# Patient Record
Sex: FEMALE | Race: WHITE
[De-identification: names, ages, dates, MRNs, and addresses within clinical notes are randomized per-mention and may not be internally consistent; named-entity substitution may affect disease eponyms.]

---

## 2017-05-11 ENCOUNTER — HOSPITAL ENCOUNTER (OUTPATIENT)
Dept: HOSPITAL 45 - C.ONC | Age: 52
Discharge: HOME | End: 2017-05-11
Attending: PHYSICIAN ASSISTANT
Payer: COMMERCIAL

## 2017-05-11 VITALS
SYSTOLIC BLOOD PRESSURE: 110 MMHG | HEART RATE: 68 BPM | DIASTOLIC BLOOD PRESSURE: 78 MMHG | OXYGEN SATURATION: 96 % | TEMPERATURE: 98.78 F

## 2017-05-11 DIAGNOSIS — Z92.3: ICD-10-CM

## 2017-05-11 DIAGNOSIS — Z85.3: ICD-10-CM

## 2017-05-11 DIAGNOSIS — Z08: Primary | ICD-10-CM

## 2017-05-11 NOTE — RADIATION ONCOLOGY FOLLOW-UP
Radiation Oncology Follow-Up


Date of Visit


May 11, 2017.


 (Audra Kirk PA-C)





Reason For Visit


One-month follow-up and cancer survivorship care plan


 (Audra Kirk PA-C)





Radiation Completion Date


4/10/17


 (Audra Kirk PA-C)





Diagnosis





(1) DCIS (ductal carcinoma in situ) of breast


Onset Date:  11/18/2016


Stage:  0


Permanent Comment:  Status post abnormal right breast mammogram 10/28/2016


Status post stereotactic guided biopsies 11/18/2016 2 lesions were biopsied and 

positive for DCIS


Estrogen receptor positive and progesterone receptor positive


Status post lumpectomies and sentinel lymph node biopsy 11/23/2016


Multifocal DCIS


Stage pTis [id number]


Status post completion of radiation therapy 04/10/2017 received 6640 cGy  Last 

Edited By: Audra Kirk on Apr 20, 2017 10:33 (Audra Kirk PA-C)





History of Present Illness


Ms. Mazariegos is a 52-year-old postmenopausal female who initially presented with 

an abnormal mammogram on 10/28/2016 which revealed a cluster of calcifications 

in the right breast.  The patient was brought back on 11/09/2016 for a 

unilateral right diagnostic mammogram and ultrasound which revealed a new 

irregular 8.7 mm cluster of pleomorphic an amorphous cluster of 

microcalcifications in the 9:00 middle one third of the right breast and a 

smaller appearing cluster of microcalcifications just inferior and medial to 

the first measuring 3.6 mm.  At the time of the procedure and studies, the 

radiologist didn't note the patient did have a raised lesion along the scar at 

the 10:00 right breast and was referred to a dermatologist appropriately.  The 

patient underwent stereotactic guided biopsies of the 2 lesions in the right 

breast on 11/18/2016.  The  larger right lesion was positive for ductal 

carcinoma in situ that was high-grade with comedonecrosis and was estrogen 

receptor positive and progesterone receptor positive.  The smaller right breast 

lesion was also positive for ductal carcinoma in situ that was high-grade with 

comedonecrosis and was also estrogen receptor positive and progesterone 

receptor positive.  The patient was referred





The patient was referred to Dr. Urmila Gold who did discuss treatment options 

including a mastectomy and lumpectomy followed by adjuvant radiation therapy.  

The patient was also seen by reconstructive surgeon for consideration of 

bilateral breast reduction surgery however she ultimately declined this 

procedure.  The patient elected for a lumpectomy and sentinel lymph node biopsy 

for both breast lesions in the right breast.  The patient underwent 2 

lumpectomies and sentinel lymph node biopsy of the right breast on 12/23/2016 

by Dr. Urmila Gold.  Pathology revealed multifocal ductal carcinoma in situ 

that was high-grade with comedonecrosis.  Margins were negative (full pathology 

report below).  The sentinel lymph nodes were also negative. 





The patient has been seen in consultation by Dr. Anatoliy Conroy from medical 

oncology who has recommended consideration of anti-hormonal therapy.  We are 

now seeing the patient in consultation discussed role of adjuvant radiation 

therapy.





Patient was treated with conventional radiation therapy.  Treatment was 

completed 04/10/2017.  She received 6640 cGy.





Overall, the patient is to well and has healed up well from surgery.  Of note, 

the patient did have a diagnosis of low-grade and early stage endometrial 

cancer that was treated with a LUCITA/BSO and no adjuvant therapy.


 (Audra Kirk PA-C)





Interim History


She's been doing well over this past month.  The irritation of the skin has 

steadily improved.  This mainly healed the 2 weeks following the completion of 

treatment.  She has no occasional discomfort of the breast.  She describes it 

as a "twinge".  She has noted no masses or tenderness and no change of the 

axilla.  She's had no swelling of her arm.  She has seen Dr. Gold and is 

scheduled for mammography June 1.  She has started tamoxifen and is tolerating 

this well.  Following the completion of treatment she had symptoms of upper 

respiratory infection.  These have steadily improved.  The swelling of the 

breast that occurred during treatment has continued to improve as well as the 

darkness of the skin.


 (Audra Kirk PA-C)





Allergies


Coded Allergies:  


     No Known Drug Allergy (Verified  Allergy, Unknown, None , 1/24/17)





Home Medications


Scheduled


Aloe Vera (Aloe Vera), 1 CAP PO DAILY


Atenolol (Tenormin), 12.5 MG PO DAILY


Biotin (Biotin), 4-8 TABS PO DAILY


Cinnamon (Cinnamon), 350 MG PO DAILY


Cranberry (Vaccinium Macrocarp (Cranberry), 1 CAP PO DAILY


Glucosamine-Chondroitin-Vit C- (Glucosamine Chondroitin), 1 TAB PO DAILY


Lutein-Zeaxanthin (Lutein W/Zeaxanthin), 1 TAB PO DAILY


Magnesium Oxide (Mag-Ox), 250 MG PO DAILY


Multiple Vitamins W/ Minerals (Antioxidant), 1 CAP PO Q2D


Multivitamin (Multivitamin), 1 TAB PO DAILY


Probiotic Product (Probiotic), 1 TAB PO Evening


Resveratrol (Resveratrol), 1 CAP PO DAILY


Spironolactone (Aldactone), 50 MG PO BID


Tamoxifen (Nolvadex), 20 MG PO DAILY


[Circulation support], 2 TABS PO DAILY


[Immune factors], 1 CAP PO DAILY


[Levoxyl ], 100 MCG PO DAILY





Scheduled PRN


[Digest assure], 1 TAB PO DAILY PRN for Dyspepsia


[Fluticasone Prop], 50 MCG NA BID PRN for allergies





Review of Systems


Gastrointestinal:  


   Symptoms:  WNL


Oral:  


   Symptoms:  No Problems


Respiratory:  


   Symptoms:  WNL


Urinary:  


   Symptoms:  WNL


Skin:  


   Other Skin Symptoms:  Skin on right breast more tan in color w/pinkness near 

areola;intact


Breast:  


   Right Upper Arm Measurement:  43.5


   Right Mid Arm Measurement:  31.5


   Right Wrist Measurement:  18.0


   Left Upper Arm Measurement:  45.0


   Left Mid Arm Measurement:  33.0


   Left Wrist Measurement:  18.3


   Arm Dominence:  Right


Additional Notes:


She completed a distress management report and answered "no" to all questions.


 (Audra Kirk PA-C)





Physical Exam





Vital Signs








  Date Time  Temp Pulse Resp B/P Pulse Ox O2 Delivery O2 Flow Rate FiO2


 


5/11/17 14:51 37.1 68 12 110/78 96   








Fatigue:  None


General Appearance:  no apparent distress


Eyes:  normal inspection, EOMI


ENT:  normal ENT inspection, hearing grossly normal


Neck:  no adenopathy, thyroid normal


Respiratory/Chest:  lungs clear, no respiratory distress, no accessory muscle 

use


Breast:


Breast examination reveals well-healed incisions of the right breast.  There is 

resolving hyperpigmentation.  Resolving erythema.  The edema has greatly 

improved.  There are no masses or tenderness and no axillary adenopathy.  Using 

the Hebron score cosmesis she has a a fair outcome currently.  There are no 

skin retractions or nipple changes.  The left breast showed no masses or 

tenderness and no axillary adenopathy.


Cardiovascular:  regular rate, rhythm, no gallop, no murmur


Extremities:  no pedal edema


Neurologic/Psychiatric:  no motor/sensory deficits, alert, normal mood/affect


Skin:  warm/dry


 (Audra Kirk PA-C)





Assessment & Plan


Plan: Continue regular follow-up with Dr. Gold, Dr. Conroy, and her primary 

care provider.  She continues on the tamoxifen.  Mammography is scheduled for 

June 1.  She has a follow-up appointment scheduled with Dr. Gold.  She'll 

continue to use moisturizers to the skin.  Today we completed a cancer 

survivorship care plan.  A copy of the document was given to the patient.  We 

discussed that the hyperpigmentation and mild edema will continue to improve.  

She was seen and examined by Dr. Leary.  We asked her to return to our office 

in 6 months.  She may call if she has any questions or concerns in the interim.


 (Audra Kirk PA-C)


I agree with note created by Audra Kirk PA-C. I reviewed the patient's 

chart and information with her.  I have examined and evaluated the patient. I 

reviewed relevant clinical information and answered the patient's and/or family'

s questions. 


 (Cecelia Leary MD)


Total Time In Follow-Up


I spent 20 minutes speaking to the patient and performing examination.  I spent 

20 minutes reviewing information, preparing the survivorship document, and 

completing this note.


 (Audra Kirk PA-C)


I spent 15 minutes examining and counseling the patient.  


 (Cecelia Leary MD)





Copy To


Urmila Gold MD; Anatoliy Conroy MD; Jaelyn Angelo





Problem Qualifiers





(1) DCIS (ductal carcinoma in situ) of breast:  


Laterality:  right  Qualified Codes:  D05.11 - Intraductal carcinoma in situ of 

right breast

## 2017-06-01 ENCOUNTER — HOSPITAL ENCOUNTER (OUTPATIENT)
Dept: HOSPITAL 45 - C.MAMM | Age: 52
Discharge: HOME | End: 2017-06-01
Attending: SURGERY
Payer: COMMERCIAL

## 2017-06-01 DIAGNOSIS — Z92.3: ICD-10-CM

## 2017-06-01 DIAGNOSIS — D05.11: Primary | ICD-10-CM

## 2017-06-01 NOTE — MAMMOGRAPHY REPORT
UNILATERAL RIGHT DIGITAL DIAGNOSTIC MAMMOGRAM TOMOSYNTHESIS WITH CAD: 6/1/2017

CLINICAL HISTORY: 52-year-old woman with a personal history of right breast DCIS status post lumpecto
my and radiation.  She presents for first follow-up in the right breast after treatment to establish 
a baseline.  





TECHNIQUE:  Right CC and MLO 2-D digital and tomosynthesis images, repeat 2-D right MLO, right XCCL a
nd spot magnification right CC and ML views were obtained.  Current study was also evaluated with a C
ZuzuCheuter Aided Detection (CAD) system.  



COMPARISON: Comparison is made to exams dated:  11/18/2016 stereotactic biopsy, 11/18/2016 stereotact
ic biopsy, 11/18/2016 mammogram, 11/9/2016 ultrasound, 11/9/2016 mammogram, and 10/28/2016 mammogram 
- [facility].   



BREAST COMPOSITION:  There are scattered areas of fibroglandular density in the right breast.  



FINDINGS:  There is mild diffuse skin thickening and trabecular edema of the right breast.  Focal asy
mmetry and expected architectural distortion in the upper outer middle one third of the right breast,
 at the site of prior lumpectomy.  There are stable circumscribed subcentimeter masses in the upper o
uter posterior right breast, unchanged on prior exams dating back to at least 2009, most likely intra
mammary lymph nodes.  There are scattered round benign-appearing microcalcifications throughout the r
ight breast, which are stable compared to prior mammograms.  No new suspicious grouping or cluster of
 microcalcifications are identified.  No suspicious mass or unexpected architectural distortion. 





IMPRESSION:  ACR-BI-RADS CATEGORY 3: PROBABLY BENIGN

Expected post treatment changes in the right breast, without definite mammographic evidence of malign
tali.  Recommend a short interval follow-up (6 months) diagnostic right mammogram and repeat spot mag
nification views to ensure stability after treatment.  Annual left mammography will be due at that ti
me.



These results and recommendations were discussed with the patient at the time of the exam.  

  





Approximately 10% of breast cancers are not detected with mammography. A negative mammographic report
 should not delay biopsy if a clinically suggestive mass is present.



Mony Jiang M.D.          

ay/:6/1/2017 09:46:49  



Imaging Technologist: Shelly Rosas RT(R)(M), [facility]

letter sent: Follow Up Recommended 3  

BI-RADS Code: ACR-BI-RADS Category 3: Probably Benign

## 2017-08-11 ENCOUNTER — HOSPITAL ENCOUNTER (OUTPATIENT)
Dept: HOSPITAL 45 - C.LAB1850 | Age: 52
Discharge: HOME | End: 2017-08-11
Attending: DERMATOLOGY
Payer: COMMERCIAL

## 2017-08-11 DIAGNOSIS — L65.9: Primary | ICD-10-CM

## 2017-08-11 LAB
FERRITIN SERPL-MCNC: 57 NG/ML (ref 8–388)
TIBC SERPL-MCNC: 389 MCG/DL (ref 250–450)

## 2017-08-14 LAB — TESTOST SERPL-MCNC: 31 NG/DL (ref 2–45)

## 2017-08-17 NOTE — CODING QUERY MEDICAL NECESSITY
CQSUPPORTING DIAGNOSIS NEEDED





A supporting diagnosis is required for the test/procedure performed on this patient in 
order for us to be reimbursed by the patient's insurance. Please provide a supporting 
diagnosis for the following test/procedure listed below next to the test name along with 
your signature. 



*If there is no additional diagnosis for this patient that would support the following 
test/procedure please document that below next to the test/procedure.



Test(s)/Procedure(s) that require a supporting diagnosis:





DOS  08/11/17     VITAMIN D TEST





Provider Signature:  ______________________________  Date:  _______



Thank you  

Aicha Shanks

Health Information Management

Phone:  [phone]

Fax:  [phone]



Once completed, please kindly fax back to [phone]



For questions please call [phone]

## 2017-08-25 ENCOUNTER — HOSPITAL ENCOUNTER (OUTPATIENT)
Dept: HOSPITAL 45 - C.ULTR | Age: 52
Discharge: HOME | End: 2017-08-25
Attending: INTERNAL MEDICINE
Payer: COMMERCIAL

## 2017-08-25 DIAGNOSIS — R25.2: ICD-10-CM

## 2017-08-25 DIAGNOSIS — D05.11: Primary | ICD-10-CM

## 2017-08-25 NOTE — DIAGNOSTIC IMAGING REPORT
BILATERAL LOWER EXTREMITY VENOUS DOPPLER



CLINICAL HISTORY: Bilateral leg pain and cramps.    



COMPARISON STUDY:  No previous studies for comparison. 



TECHNIQUE:  Sonography of the deep venous system of the bilateral lower

extremities was performed. Compression and augmentation were evaluated.



FINDINGS:  The bilateral common femoral, superficial femoral and popliteal veins

were compressible. Augmentation was normal. Flow was shown within the deep calf

vessels.



IMPRESSION: No evidence of deep venous thrombus within the bilateral lower

extremities.







Electronically signed by:  Boyd Ulrich M.D.

8/25/2017 3:42 PM



Dictated Date/Time:  8/25/2017 3:42 PM

## 2017-10-16 ENCOUNTER — HOSPITAL ENCOUNTER (OUTPATIENT)
Dept: HOSPITAL 45 - C.GI | Age: 52
Discharge: HOME | End: 2017-10-16
Attending: SPECIALIST
Payer: COMMERCIAL

## 2017-10-16 VITALS
BODY MASS INDEX: 47.94 KG/M2 | HEIGHT: 62.99 IN | WEIGHT: 270.57 LBS | BODY MASS INDEX: 47.94 KG/M2 | WEIGHT: 270.57 LBS | HEIGHT: 62.99 IN

## 2017-10-16 VITALS — HEART RATE: 67 BPM | DIASTOLIC BLOOD PRESSURE: 96 MMHG | OXYGEN SATURATION: 97 % | SYSTOLIC BLOOD PRESSURE: 154 MMHG

## 2017-10-16 DIAGNOSIS — Z80.0: ICD-10-CM

## 2017-10-16 DIAGNOSIS — Z79.899: ICD-10-CM

## 2017-10-16 DIAGNOSIS — D12.2: ICD-10-CM

## 2017-10-16 DIAGNOSIS — Z12.11: Primary | ICD-10-CM

## 2017-10-16 DIAGNOSIS — K57.30: ICD-10-CM

## 2017-10-16 NOTE — DISCHARGE INSTRUCTIONS
Endoscopy Patient Instructions


Date / Procedure(s) Performed


Oct 16, 2017.


Colonoscopy





Allergy Information


Coded Allergies:  


     NO KNOWN DRUG ALLERGIES (Verified  Allergy, Unknown, ., 10/16/17)





Discharge Date / Findings


Oct 16, 2017.


small polyp removed; diverticulosis sigmoid





Medication Instructions


Restart Stopped Medication(s):





Reported Home Medications








 Medications  Dose


 Route/Sig


 Max Daily Dose Days Date Category


 


 Clarispray


  (Fluticasone


 Propionate


  (Nasal)) 50


 Mcg/Act Spr  2 Spray


 THALIA BID PRN


    10/6/17 Reported


 


 Aldactone


  (Spironolactone)


 50 Mg Tab  50 Mg


 PO QAM


    10/6/17 Reported


 


 Arimidex


  (Anastrozole) 1


 Mg Tab  1 Mg


 PO QAM


    10/6/17 Reported


 


 Levothyroxine


 Sodium 100 Mcg Tab  1 Tab


 PO QAM


   90 10/6/17 Reported


 


 Biotin 1 Mg Cap  1 Cap


 PO BID


    10/6/17 Reported


 


 Zestril


  (Lisinopril) 2.5


 Mg Tab  1 Tab


 PO QAM


    10/6/17 Reported


 


 Lutein


 W/Zeaxanthin


  (Lutein-Zeaxanthin)


 1 Tab Tab  1 Tab


 PO DAILY


    5/11/17 Reported


 


 [Immune factors]    1 Cap


 PO DAILY


    5/11/17 Reported


 


 [Digest assure]    1 Tab


 PO DAILY PRN


    5/11/17 Reported


 


 [Circulation


 support]  2 Tabs


 PO DAILY


    5/11/17 Reported


 


 Glucosamine


 Chondroitin


  (Glucosamine-Chondroitin-Vit


 C-) 1 Tab Tab  1 Tab


 PO DAILY


    5/11/17 Reported


 


 Antioxidant


  (Multiple


 Vitamins W/


 Minerals) 1 Cap


 Cap  1 Cap


 PO Q2D


    4/3/17 Reported


 


 Cinnamon 500 Mg


 Cap  350 Mg


 PO DAILY


    1/24/17 Reported


 


 Cranberry


  (Cranberry


  (Vaccinium


 Macrocarp) 250 Mg


 Cap  1 Cap


 PO DAILY


    1/24/17 Reported


 


 Mag-Ox (Magnesium


 Oxide) 400 Mg Tab  250 Mg


 PO DAILY


    1/24/17 Reported


 


 Multivitamin


  (Multivitamins)


 Tab  1 Tab


 PO DAILY


    1/24/17 Reported











Reported Home Medications








 Medications  Dose


 Route/Sig


 Max Daily Dose Days Date Category


 


 Clarispray


  (Fluticasone


 Propionate


  (Nasal)) 50


 Mcg/Act Spr  2 Spray


 THALIA BID PRN


    10/6/17 Reported


 


 Aldactone


  (Spironolactone)


 50 Mg Tab  50 Mg


 PO QAM


    10/6/17 Reported


 


 Arimidex


  (Anastrozole) 1


 Mg Tab  1 Mg


 PO QAM


    10/6/17 Reported


 


 Levothyroxine


 Sodium 100 Mcg Tab  1 Tab


 PO QAM


   90 10/6/17 Reported


 


 Biotin 1 Mg Cap  1 Cap


 PO BID


    10/6/17 Reported


 


 Zestril


  (Lisinopril) 2.5


 Mg Tab  1 Tab


 PO QAM


    10/6/17 Reported


 


 Lutein


 W/Zeaxanthin


  (Lutein-Zeaxanthin)


 1 Tab Tab  1 Tab


 PO DAILY


    5/11/17 Reported


 


 [Immune factors]    1 Cap


 PO DAILY


    5/11/17 Reported


 


 [Digest assure]    1 Tab


 PO DAILY PRN


    5/11/17 Reported


 


 [Circulation


 support]  2 Tabs


 PO DAILY


    5/11/17 Reported


 


 Glucosamine


 Chondroitin


  (Glucosamine-Chondroitin-Vit


 C-) 1 Tab Tab  1 Tab


 PO DAILY


    5/11/17 Reported


 


 Antioxidant


  (Multiple


 Vitamins W/


 Minerals) 1 Cap


 Cap  1 Cap


 PO Q2D


    4/3/17 Reported


 


 Cinnamon 500 Mg


 Cap  350 Mg


 PO DAILY


    1/24/17 Reported


 


 Cranberry


  (Cranberry


  (Vaccinium


 Macrocarp) 250 Mg


 Cap  1 Cap


 PO DAILY


    1/24/17 Reported


 


 Mag-Ox (Magnesium


 Oxide) 400 Mg Tab  250 Mg


 PO DAILY


    1/24/17 Reported


 


 Multivitamin


  (Multivitamins)


 Tab  1 Tab


 PO DAILY


    1/24/17 Reported











Provider Instructions





Activity Restrictions





-  No exercising or heavy lifting for 24 hours. 


-  Do not drink alcohol the day of the procedure.


-  Do not drive a car or operate machinery until the day after the procedure.


-  Do not make any important decisions or sign important papers in 24 hours 

after the procedure.





Following Day:





-  Return to full activity which may include returning to work/school.





Diet





Start your diet with liquids and light foods (jello, soup, juice, toast).  Then 

eat your usual diet if not nauseated.





Treatment For Common After Affects





For mild abdominal pain, bloating, or excessive gas:





-  Rest


-  Eat lightly


-  Lie on right side





Follow-Up Information


Follow-up with Dr Rupali Flores as scheduled





Anesthesia Information





What You Should Know





You have had a procedure that required some medicine to reduce anxiety and 

discomfort. This treatment is called moderate sedation.  


After receiving the treatment, you may be sleepy, but you will be able to 

breathe on your own.  The effects of the treatment may last for several hours.








Follow these instructions along with Activity/Diet recommendations noted above:





*  Do NOT do anything where dizziness or clumsiness would be dangerous.





*  Rest quietly at home today, then you can be up and about tomorrow.





*  Have a responsible person stay with you the rest of today.





*  You may have had an I.V. today.  If so, you may take the dressing off later 

today.





Recommendations


 


Call your doctor if:





*  Trouble breathing 





*  Continuous vomiting for more than 24 hours








*  Temperature above 101 degrees





*  Severe abdominal pain or bloating





*  Pain not relieved by pain medicine ordered





*  There is increased drainage or redness from any incision





*  A large amount of rectal bleeding greater than 2-3 tablespoons. 


   (If you had a polyp/s removed or have hemorrhoids, a small amount of blood -


    from the rectum is to be expected.)





*  You have any unanswered questions or concerns.








IN THE EVENT OF A SERIOUS EMERGENCY, GO TO THE NEAREST EMERGENCY ROOM








       Your discharge instructions were prepared by provider Andrea Roque.





 Patient Instructions Signature Page














Christa Mazariegos 











Patient (or Guardian) Signature/Date:____________________________________ I 

have read and understand the instructions given to me by my caregivers.








Caregiver/RN/Doctor Signature/Date:____________________________________











The above-named patient and/or guardian has received patient instructions on 

this date.





























+  Original Patient Signature Page (only) stays with chart.  Please make copy 

for patient.

## 2017-10-16 NOTE — ANESTHESIOLOGY PROGRESS NOTE
Anesthesia Post Op Note


Date & Time


Oct 16, 2017 at 14:10





Vital Signs


Pain Intensity:  0





Vital Signs Past 12 Hours








  Date Time  Temp Pulse Resp B/P (MAP) Pulse Ox O2 Delivery O2 Flow Rate FiO2


 


10/16/17 14:00  67 18 154/96 (115) 97 Room Air  


 


10/16/17 13:45  63 18 145/92 (109) 97 Room Air  


 


10/16/17 13:30  69 18 124/83 (97) 97 Room Air  


 


10/16/17 12:35 37.3 98 20 230/94 (139) 97 Room Air  





    242/102 (148)    











Notes


Mental Status:  alert / awake / arousable, participated in evaluation


Pt Amnestic to Procedure:  Yes


Nausea / Vomiting:  adequately controlled


Pain:  adequately controlled


Airway Patency, RR, SpO2:  stable & adequate


BP & HR:  stable & adequate


Hydration State:  stable & adequate


Anesthetic Complications:  no major complications apparent

## 2017-10-16 NOTE — GI REPORT
Procedure Date: 10/16/2017 12:51 PM

Procedure:            Colonoscopy

Indications:          Family history of colon cancer in multiple first-degree 

                      relatives

Medicines:            Propofol per Anesthesia

Complications:        No immediate complications. Estimated blood loss: 

                      Minimal.

Estimated Blood Loss: Estimated blood loss was minimal.

Procedure:            Pre-Anesthesia Assessment:

                      - Prior to the procedure, a History and Physical was 

                      performed, and patient medications and allergies were 

                      reviewed. The patient's tolerance of previous 

                      anesthesia was also reviewed. The risks and benefits of 

                      the procedure and the sedation options and risks were 

                      discussed with the patient. All questions were 

                      answered, and informed consent was obtained. Prior 

                      Anticoagulants: The patient has taken no previous 

                      anticoagulant or antiplatelet agents. ASA Grade 

                      Assessment: III - A patient with severe systemic 

                      disease. After reviewing the risks and benefits, the 

                      patient was deemed in satisfactory condition to undergo 

                      the procedure.

                      After I obtained informed consent, the scope was passed 

                      under direct vision. Throughout the procedure, the 

                      patient's blood pressure, pulse, and oxygen saturations 

                      were monitored continuously. The scope was introduced 

                      through the anus and advanced to the cecum, identified 

                      by appendiceal orifice and ileocecal valve. The 

                      colonoscopy was performed without difficulty. The 

                      patient tolerated the procedure well. The quality of 

                      the bowel preparation was good.

Findings:

     The perianal and digital rectal examinations were normal. Pertinent 

     negatives include normal sphincter tone, no palpable rectal lesions and 

     no anal lesion or abnormality was detected.

     A 2 mm polyp was found in the ascending colon. The polyp was sessile. 

     The polyp was removed with a cold biopsy forceps. Resection and 

     retrieval were complete. Estimated blood loss was minimal. Verification 

     of patient identification for the specimen was done by the physician and 

     technician using the patient's name and medical record number.

     A few small-mouthed diverticula were found in the sigmoid colon.

     The exam was otherwise without abnormality.

     The retroflexed view of the distal rectum and anal verge was normal and 

     showed no anal or rectal abnormalities.

Impression:           - One 2 mm polyp in the ascending colon, removed with a 

                      cold biopsy forceps. Resected and retrieved.

                      - Diverticulosis in the sigmoid colon.

                      - The examination was otherwise normal.

                      - The distal rectum and anal verge are normal on 

                      retroflexion view.

Recommendation:       - Discharge patient to home (ambulatory).

                      - Resume regular diet.

                      - Continue present medications.

                      - Await pathology results.

                      - Repeat colonoscopy for surveillance based on 

                      pathology results.

                      - Return to referring physician as previously scheduled.

MD Andrea Echeverria, MD

10/16/2017 1:42:00 PM

This report has been signed electronically.

Note Initiated On: 10/16/2017 12:51 PM

     I attest to the content of the Intraoperative Record and orders 

     documented therein, exceptions below

## 2017-11-14 ENCOUNTER — HOSPITAL ENCOUNTER (OUTPATIENT)
Dept: HOSPITAL 45 - C.ONC | Age: 52
Discharge: HOME | End: 2017-11-14
Attending: PHYSICIAN ASSISTANT
Payer: COMMERCIAL

## 2017-11-14 VITALS
HEART RATE: 96 BPM | DIASTOLIC BLOOD PRESSURE: 80 MMHG | TEMPERATURE: 98.06 F | OXYGEN SATURATION: 99 % | SYSTOLIC BLOOD PRESSURE: 148 MMHG

## 2017-11-14 DIAGNOSIS — Z86.000: ICD-10-CM

## 2017-11-14 DIAGNOSIS — Z08: Primary | ICD-10-CM

## 2017-11-14 DIAGNOSIS — Z92.3: ICD-10-CM

## 2017-11-14 NOTE — RADIATION ONCOLOGY FOLLOW-UP
Radiation Oncology Follow-Up


Date of Visit


Nov 14, 2017.





Reason For Visit


6 month follow-up





Radiation Completion Date


4/10/17





Diagnosis





(1) DCIS (ductal carcinoma in situ) of breast


Onset Date:  11/18/2016


Stage:  0


Permanent Comment:  Status post abnormal right breast mammogram 10/28/2016


Status post stereotactic guided biopsies 11/18/2016 2 lesions were biopsied and 

positive for DCIS


Estrogen receptor positive and progesterone receptor positive


Status post lumpectomies and sentinel lymph node biopsy 11/23/2016


Multifocal DCIS


Stage pTis pN0M0


Status post completion of radiation therapy 04/10/2017 received 6640 cGy  Last 

Edited By: Audra Kirk on Apr 20, 2017 10:33





History of Present Illness


Ms. Mazariegos is postmenopausal female who initially presented with an abnormal 

mammogram on 10/28/2016 which revealed a cluster of calcifications in the right 

breast.  The patient was brought back on 11/09/2016 for a unilateral right 

diagnostic mammogram and ultrasound which revealed a new irregular 8.7 mm 

cluster of pleomorphic an amorphous cluster of microcalcifications in the 9:00 

middle one third of the right breast and a smaller appearing cluster of 

microcalcifications just inferior and medial to the first measuring 3.6 mm.  At 

the time of the procedure and studies, the radiologist didn't note the patient 

did have a raised lesion along the scar at the 10:00 right breast and was 

referred to a dermatologist appropriately.  The patient underwent stereotactic 

guided biopsies of the 2 lesions in the right breast on 11/18/2016.  The  

larger right lesion was positive for ductal carcinoma in situ that was high-

grade with comedonecrosis and was estrogen receptor positive and progesterone 

receptor positive.  The smaller right breast lesion was also positive for 

ductal carcinoma in situ that was high-grade with comedonecrosis and was also 

estrogen receptor positive and progesterone receptor positive.  The patient was 

referred





The patient was referred to Dr. Urmila Gold who did discuss treatment options 

including a mastectomy and lumpectomy followed by adjuvant radiation therapy.  

The patient was also seen by reconstructive surgeon for consideration of 

bilateral breast reduction surgery however she ultimately declined this 

procedure.  The patient elected for a lumpectomy and sentinel lymph node biopsy 

for both breast lesions in the right breast.  The patient underwent 2 

lumpectomies and sentinel lymph node biopsy of the right breast on 12/23/2016 

by Dr. Urmila Gold.  Pathology revealed multifocal ductal carcinoma in situ 

that was high-grade with comedonecrosis.  Margins were negative (full pathology 

report below).  The sentinel lymph nodes were also negative. 





The patient has been seen in consultation by Dr. Anatoliy Conroy from medical 

oncology who has recommended consideration of anti-hormonal therapy.  We are 

now seeing the patient in consultation discussed role of adjuvant radiation 

therapy.





Patient was treated with conventional radiation therapy.  Treatment was 

completed 04/10/2017.  She received 6640 cGy.





Overall, the patient is to well and has healed up well from surgery.  Of note, 

the patient did have a diagnosis of low-grade and early stage endometrial 

cancer that was treated with a LUCITA/BSO and no adjuvant therapy.





Interim History


She's been doing well over the past 6 months.  The discoloration of the breast 

has steadily improved.  She also feels there is less swelling over time.  She 

has very minimal occasional twinge in the breast.  She continues to use skin 

moisturizer.  She denies a feeling of heaviness of the breast.  She has noticed 

no masses or axillary adenopathy.  She has noticed no swelling of her arm.  She 

has been followed closely by medical oncology.  She was prescribed tamoxifen.  

She unfortunately had multiple side effects including leg cramping, hair loss, 

feeling of warmth, insomnia, and constipation.  Because of the cramping in the 

calves she did have an ultrasound to rule out a DVT.  That was negative for 

findings.  She also had joint pain and discomfort.  She stopped the medication 

for a 2 month time.  She did see Dr. Conroy in started anastrozole earlier this 

summer.  She's been able to tolerate the medicine and she is continued to take 

it.





Allergies


Coded Allergies:  


     NO KNOWN DRUG ALLERGIES (Verified  Allergy, Unknown, ., 10/16/17)





Home Medications


Scheduled


Anastrozole (Arimidex), 1 MG PO QAM


Biotin (Biotin), 1 CAP PO BID


Cinnamon (Cinnamon), 350 MG PO DAILY


Cranberry (Vaccinium Macrocarp (Cranberry), 1 CAP PO DAILY


Glucosamine-Chondroitin-Vit C- (Glucosamine Chondroitin), 1 TAB PO DAILY


Levothyroxine Sodium (Levothyroxine Sodium), 1 TAB PO QAM


Lisinopril (Zestril), 1 TAB PO QAM


Lutein-Zeaxanthin (Lutein W/Zeaxanthin), 1 TAB PO DAILY


Magnesium Oxide (Mag-Ox), 250 MG PO DAILY


Multiple Vitamins W/ Minerals (Antioxidant), 1 CAP PO Q2D


Multivitamin (Multivitamin), 1 TAB PO DAILY


Spironolactone (Aldactone), 50 MG PO QAM


[Circulation support], 2 TABS PO DAILY


[Immune factors], 1 CAP PO DAILY





Scheduled PRN


Fluticasone Propionate (Nasal) (Clarispray), 2 SPRAY THALIA BID PRN for PRN


[Digest assure], 1 TAB PO DAILY PRN for Dyspepsia





Review of Systems


Gastrointestinal:  


   Symptoms:  WNL


Oral:  


   Symptoms:  No Problems


Respiratory:  


   Symptoms:  WNL


   Other Respiratory:  I cough sometimes I think from my lisinopril.


Urinary:  


   Symptoms:  WNL


Skin:  


   Symptoms:  No Problems


   Other Skin Symptoms:  some brownish tint yet


Breast:  


   Right Upper Arm Measurement:  42.0


   Right Mid Arm Measurement:  31.0


   Right Wrist Measurement:  18.0


   Left Upper Arm Measurement:  43.5


   Left Mid Arm Measurement:  32.0


   Left Wrist Measurement:  18.0


   Arm Dominence:  Right





Physical Exam





Vital Signs








  Date Time  Temp Pulse Resp B/P (MAP) Pulse Ox O2 Delivery O2 Flow Rate FiO2


 


11/14/17 14:56 36.7 96 20 148/80 99   








Fatigue:  None


General Appearance:  no apparent distress


Eyes:  normal inspection, EOMI


ENT:  normal ENT inspection, hearing grossly normal


Neck:  no adenopathy, thyroid normal


Respiratory/Chest:  lungs clear, no respiratory distress, no accessory muscle 

use


Breast:


Large pendulous breasts.  The right breast shows mild pink discoloration.  

There is mild edema.  There are no masses or tenderness and no axillary 

adenopathy.  She has no skin retractions or nipple changes.  Using the [place] 

score cosmesis she has a good outcome.  The left breast showed no masses or 

tenderness no axillary adenopathy.


Cardiovascular:  regular rate, rhythm, no gallop, no murmur


Abdomen:  non tender, soft


Extremities:  no pedal edema


Neurologic/Psychiatric:  no motor/sensory deficits, alert, normal mood/affect


Skin:  warm/dry


Lymphatic:  no adenopathy





Pain Management


Pain Duration:  started Anastrazole this summer


Side:  Bilateral


Pain Location:  joints


Patient Preferred Pain Scale:  0 - 10


Pain Description:  Aching





Laboratory Studies











Test


  10/31/17


10:35


 


Magnesium Level


  2.1 mg/dl


(1.8-2.4)


 


25-Hydroxy Vitamin D Total


  36.6 ng/ml


()











Additional Studies


 











Patient: BRIAN MAZARIEGOS 


[facility] Rec: [id number] Address1:  [id number]


Address2:   


 


Acct ID: [id number]


YOB: 1965    Sex: F


Ref Phy: Ebony Zapien M.D.


Att Phy:  Urmila Gold MD


Reina Phy:  Jaelyn Angelo


Inter Phy:  Mony Jiang MD  [facility] Zip:  [place] [address]


SC:  JOSE RMAMM


Report #:  [id number]


Technician:  SHANELL


Diagnosis:  RT DCIS


Service Date:  06/01/17


MNE:  [id number]


 


Ordering Dr: Urmila Gold MD


CC:  Urmila Gold MD CONF:  


DICTATED BY:  Mony Jiang MD 








 MAMMOGRAPHY REPORT


  





UNILATERAL RIGHT DIGITAL DIAGNOSTIC MAMMOGRAM TOMOSYNTHESIS WITH CAD: 6/1/2017


CLINICAL HISTORY: 52-year-old woman with a personal history of right breast 

DCIS status post lumpectomy and radiation.  She presents for first follow-up in 

the right breast after treatment to establish a baseline.  








TECHNIQUE:  Right CC and MLO 2-D digital and tomosynthesis images, repeat 2-D 

right MLO, right XCCL and spot magnification right CC and ML views were 

obtained.  Current study was also evaluated with a Computer Aided Detection (CAD

) system.  





COMPARISON: Comparison is made to exams dated:  11/18/2016 stereotactic biopsy, 

11/18/2016 stereotactic biopsy, 11/18/2016 mammogram, 11/9/2016 ultrasound, 11/9 /2016 mammogram, and 10/28/2016 mammogram - [facility].   





BREAST COMPOSITION:  There are scattered areas of fibroglandular density in the 

right breast.  





FINDINGS:  There is mild diffuse skin thickening and trabecular edema of the 

right breast.  Focal asymmetry and expected architectural distortion in the 

upper outer middle one third of the right breast, at the site of prior 

lumpectomy.  There are stable circumscribed subcentimeter masses in the upper 

outer posterior right breast, unchanged on prior exams dating back to at least 

2009, most likely intramammary lymph nodes.  There are scattered round benign-

appearing microcalcifications throughout the right breast, which are stable 

compared to prior mammograms.  No new suspicious grouping or cluster of 

microcalcifications are identified.  No suspicious mass or unexpected 

architectural distortion. 








IMPRESSION:  ACR-BI-RADS CATEGORY 3: PROBABLY BENIGN


Expected post treatment changes in the right breast, without definite 

mammographic evidence of malignancy.  Recommend a short interval follow-up (6 

months) diagnostic right mammogram and repeat spot magnification views to 

ensure stability after treatment.  Annual left mammography will be due at that 

time.





These results and recommendations were discussed with the patient at the time 

of the exam.  


  








Approximately 10% of breast cancers are not detected with mammography. A 

negative mammographic report should not delay biopsy if a clinically suggestive 

mass is present.





Mony Jiang M.D.          


ay/:6/1/2017 09:46:49  





Imaging Technologist: Shelly HARGROVE(IGNACIA)(JIM), [facility]


letter sent: Follow Up Recommended 3  


BI-RADS Code: ACR-BI-RADS Category 3: Probably Benign


__________________ ________________


Dictated by: Mony Jiang MD


Signed by: Mony Jiang MD





Assessment & Plan


Plan: Continue with scheduled mammography.  She has a mammogram scheduled next 

month.  Continue follow-up with Dr. Conroy.  She is continuing to take the 

anastrozole.  She was seen and examined by Dr. Leary.  She has a follow-up 

appointment with Dr. Gold on 12/14/2017.  We asked her to return to our 

office in 1 year.  She may call if she has any questions or concerns in the 

interim.





Assessment & Plan (Attending)


ADDENDUM: I agree with note created by Audra Kirk PA-C. I reviewed the 

patient's chart and information with her.  I have examined and evaluated the 

patient. I reviewed relevant clinical information and answered the patient's and

/or family's questions. 





Total Time


In Follow-Up


I spent 20 minutes speaking to the patient and performing examination.  I spent 

15 minutes reviewing information and completing this note.





Total Time (Attending)


In Follow-Up


I spent 15 minutes examining and counseling the patient. 





Copy To


Urmila Gold MD; Anatoliy Conroy MD; Yohn, Jaelyn C.R.N.P.





Problem Qualifiers





(1) DCIS (ductal carcinoma in situ) of breast:  


Laterality:  right  Qualified Codes:  D05.11 - Intraductal carcinoma in situ of 

right breast

## 2017-12-08 ENCOUNTER — HOSPITAL ENCOUNTER (OUTPATIENT)
Dept: HOSPITAL 45 - C.MAMM | Age: 52
Discharge: HOME | End: 2017-12-08
Attending: OBSTETRICS & GYNECOLOGY
Payer: COMMERCIAL

## 2017-12-08 DIAGNOSIS — Z86.000: ICD-10-CM

## 2017-12-08 DIAGNOSIS — Z98.890: Primary | ICD-10-CM

## 2017-12-08 DIAGNOSIS — Z92.3: ICD-10-CM

## 2017-12-08 NOTE — MAMMOGRAPHY REPORT
BILATERAL DIGITAL DIAGNOSTIC MAMMOGRAM TOMOSYNTHESIS WITH CAD: 12/8/2017

CLINICAL HISTORY: History of right breast DCIS status post lumpectomy and radiation therapy.  The pat
ient has some residual tenderness and numbness in the right breast after surgery, which is improving.
  She denies any palpable lumps.  





TECHNIQUE:  Breast tomosynthesis in addition to standard 2D mammography was performed. Current study 
was also evaluated with a Computer Aided Detection (CAD) system.  Bilateral CC and MLO 2-D and tomosy
nthesis images and spot magnification right cc and ML views were obtained.



COMPARISON: Comparison is made to exams dated:  6/1/2017 mammogram, 11/18/2016 stereotactic biopsy, 1
1/18/2016 mammogram, 10/27/2015 mammogram, 10/24/2014 mammogram, and 10/22/2013 mammogram - [facility].   



BREAST COMPOSITION:  There are scattered areas of fibroglandular density in both breasts.  



FINDINGS: Again noted are post surgical changes in the right upper outer quadrant status post lumpect
nikolai, including density and architectural distortion at the lumpectomy bed.  A linear scar marker sacha
reginaldo a scar on the right upper outer breast.  There is stable mild diffuse right breast skin thickenin
g, secondary to radiation therapy.



The remainder of both breasts are stable compared to prior exams, without suspicious masses, calcific
ations, or areas of architectural distortion noted.  Scattered bilateral punctate benign appearing ca
lcifications are not significantly changed.





IMPRESSION:  ACR-BI-RADS CATEGORY 3: PROBABLY BENIGN

Stable post treatment changes in the right breast, without mammographic evidence of malignancy in eit
her breast.  Recommend follow-up diagnostic tomosynthesis mammograms of the right breast in 6 months 
to reevaluate posttreatment changes.



The patient has been verbally notified of the results.  





Approximately 10% of breast cancers are not detected with mammography. A negative mammographic report
 should not delay biopsy if a clinically suggestive mass is present.



Jewels Castaneda M.D.          

/:12/8/2017 09:38:29  



Imaging Technologist: Shelly LAWTON)(JIM), [facility]

letter sent: Personal History 3  

BI-RADS Code: ACR-BI-RADS Category 3: Probably Benign

## 2018-03-09 ENCOUNTER — HOSPITAL ENCOUNTER (OUTPATIENT)
Dept: HOSPITAL 45 - C.LAB1850 | Age: 53
Discharge: HOME | End: 2018-03-09
Attending: DERMATOLOGY
Payer: COMMERCIAL

## 2018-03-09 DIAGNOSIS — E55.9: Primary | ICD-10-CM

## 2023-11-01 NOTE — ENDO HISTORY AND PHYSICAL
History & Physical


Date of Service:


Oct 16, 2017.


Chief Complaint:


Family hx rectal CA


Referring Physician:


Dr Rupali Flores


History of Present Illness


screening colon





Past Surgical History


Hx Cardiac Surgery:  No


Hx Internal Defibrillator:  No


Hx Pacemaker:  No


Hx Abdominal Surgery:  No


Hx of Implantable Prosthesis:  No


Hx Post-Op Nausea and Vomiting:  No


Hx Cancer Surgery:  Yes (RT LUMPECTOMY, TAHBSO)


Hx Thoracic Surgery:  No


Hx Orthopedic:  No


Hx Urinary Tract Surgery:  No





Family History


Colon CA





Social History


Smoking Status:  Never Smoker


Hx Substance Use:  No


Hx Alcohol Use:  Yes (RARELY)





Allergies


Coded Allergies:  


     NO KNOWN DRUG ALLERGIES (Verified  Allergy, Unknown, ., 10/16/17)





Current Medications





Reported Home Medications








 Medications  Dose


 Route/Sig


 Max Daily Dose Days Date Category


 


 Clarispray


  (Fluticasone


 Propionate


  (Nasal)) 50


 Mcg/Act Spr  2 Spray


 THALIA BID PRN


    10/6/17 Reported


 


 Aldactone


  (Spironolactone)


 50 Mg Tab  50 Mg


 PO QAM


    10/6/17 Reported


 


 Arimidex


  (Anastrozole) 1


 Mg Tab  1 Mg


 PO QAM


    10/6/17 Reported


 


 Levothyroxine


 Sodium 100 Mcg Tab  1 Tab


 PO QAM


   90 10/6/17 Reported


 


 Biotin 1 Mg Cap  1 Cap


 PO BID


    10/6/17 Reported


 


 Zestril


  (Lisinopril) 2.5


 Mg Tab  1 Tab


 PO QAM


    10/6/17 Reported


 


 Lutein


 W/Zeaxanthin


  (Lutein-Zeaxanthin)


 1 Tab Tab  1 Tab


 PO DAILY


    5/11/17 Reported


 


 [Immune factors]    1 Cap


 PO DAILY


    5/11/17 Reported


 


 [Digest assure]    1 Tab


 PO DAILY PRN


    5/11/17 Reported


 


 [Circulation


 support]  2 Tabs


 PO DAILY


    5/11/17 Reported


 


 Glucosamine


 Chondroitin


  (Glucosamine-Chondroitin-Vit


 C-) 1 Tab Tab  1 Tab


 PO DAILY


    5/11/17 Reported


 


 Antioxidant


  (Multiple


 Vitamins W/


 Minerals) 1 Cap


 Cap  1 Cap


 PO Q2D


    4/3/17 Reported


 


 Cinnamon 500 Mg


 Cap  350 Mg


 PO DAILY


    1/24/17 Reported


 


 Cranberry


  (Cranberry


  (Vaccinium


 Macrocarp) 250 Mg


 Cap  1 Cap


 PO DAILY


    1/24/17 Reported


 


 Mag-Ox (Magnesium


 Oxide) 400 Mg Tab  250 Mg


 PO DAILY


    1/24/17 Reported


 


 Multivitamin


  (Multivitamins)


 Tab  1 Tab


 PO DAILY


    1/24/17 Reported











Vital Signs


Weight (Kilograms):  122.73


Height (Feet):  5


Height (Inches):  3











  Date Time  Temp Pulse Resp B/P (MAP) Pulse Ox O2 Delivery O2 Flow Rate FiO2


 


10/16/17 12:35 37.3 98 20 230/94 (139) 97 Room Air  





    242/102 (148)    











Physical Exam


General Appearance:  WD/WN, no apparent distress


Respiratory/Chest:  


   Auscultation:  breath sounds normal


Cardiovascular:  


   Heart Auscultation:  RRR


Abdomen:  


   Bowel Sounds:  normal


   Inspection & Palpation:  soft, non-distended, no tenderness, guarding & 

rebound


+FH CRC





Assessment and Plan


colonscopy for screening 0 = understands/communicates without difficulty